# Patient Record
Sex: FEMALE | Race: WHITE | URBAN - METROPOLITAN AREA
[De-identification: names, ages, dates, MRNs, and addresses within clinical notes are randomized per-mention and may not be internally consistent; named-entity substitution may affect disease eponyms.]

---

## 2017-02-07 ENCOUNTER — OFFICE VISIT (OUTPATIENT)
Dept: FAMILY MEDICINE CLINIC | Age: 62
End: 2017-02-07

## 2017-02-07 VITALS
TEMPERATURE: 98.8 F | WEIGHT: 201 LBS | DIASTOLIC BLOOD PRESSURE: 76 MMHG | SYSTOLIC BLOOD PRESSURE: 134 MMHG | HEIGHT: 62 IN | OXYGEN SATURATION: 93 % | HEART RATE: 70 BPM | BODY MASS INDEX: 36.99 KG/M2

## 2017-02-07 DIAGNOSIS — N89.8 VAGINAL DISCHARGE: ICD-10-CM

## 2017-02-07 DIAGNOSIS — E78.00 PURE HYPERCHOLESTEROLEMIA: ICD-10-CM

## 2017-02-07 DIAGNOSIS — K59.09 CHRONIC CONSTIPATION: ICD-10-CM

## 2017-02-07 DIAGNOSIS — L30.9 DERMATITIS: ICD-10-CM

## 2017-02-07 DIAGNOSIS — Z11.59 SPECIAL SCREENING EXAMINATION FOR VIRAL DISEASE: ICD-10-CM

## 2017-02-07 DIAGNOSIS — K90.0 CELIAC DISEASE: ICD-10-CM

## 2017-02-07 DIAGNOSIS — E03.9 ACQUIRED HYPOTHYROIDISM: ICD-10-CM

## 2017-02-07 DIAGNOSIS — R40.0 DAYTIME SOMNOLENCE: ICD-10-CM

## 2017-02-07 DIAGNOSIS — F32.1 MODERATE SINGLE CURRENT EPISODE OF MAJOR DEPRESSIVE DISORDER (HCC): ICD-10-CM

## 2017-02-07 DIAGNOSIS — N39.3 STRESS INCONTINENCE: ICD-10-CM

## 2017-02-07 DIAGNOSIS — R30.0 DYSURIA: Primary | ICD-10-CM

## 2017-02-07 LAB
BILIRUB UR QL STRIP: NEGATIVE
GLUCOSE UR-MCNC: NEGATIVE MG/DL
KETONES P FAST UR STRIP-MCNC: NEGATIVE MG/DL
PH UR STRIP: 8.5 [PH] (ref 4.6–8)
PROT UR QL STRIP: NEGATIVE MG/DL
SP GR UR STRIP: 1.02 (ref 1–1.03)
UA UROBILINOGEN AMB POC: ABNORMAL (ref 0.2–1)
URINALYSIS CLARITY POC: CLEAR
URINALYSIS COLOR POC: YELLOW
URINE BLOOD POC: NEGATIVE
URINE LEUKOCYTES POC: ABNORMAL
URINE NITRITES POC: NEGATIVE

## 2017-02-07 RX ORDER — CLOTRIMAZOLE AND BETAMETHASONE DIPROPIONATE 10; .64 MG/G; MG/G
CREAM TOPICAL
Qty: 130 G | Refills: 0 | Status: SHIPPED | OUTPATIENT
Start: 2017-02-07

## 2017-02-07 RX ORDER — LANOLIN ALCOHOL/MO/W.PET/CERES
500 CREAM (GRAM) TOPICAL DAILY
COMMUNITY

## 2017-02-07 RX ORDER — LEVOTHYROXINE SODIUM 50 UG/1
TABLET ORAL
COMMUNITY

## 2017-02-07 RX ORDER — ERGOCALCIFEROL 1.25 MG/1
50000 CAPSULE ORAL
COMMUNITY

## 2017-02-07 RX ORDER — ESCITALOPRAM OXALATE 20 MG/1
20 TABLET ORAL
COMMUNITY

## 2017-02-07 RX ORDER — GLUCOSAMINE/CHONDR SU A SOD 750-600 MG
TABLET ORAL
COMMUNITY

## 2017-02-07 RX ORDER — DESVENLAFAXINE SUCCINATE 50 MG/1
50 TABLET, EXTENDED RELEASE ORAL
COMMUNITY

## 2017-02-07 NOTE — PATIENT INSTRUCTIONS
You have been referred to dermatology. Please call one of the preferred providers listed below and schedule your appointment. Once you have scheduled your appointment, please call the office at [phone] leave the details of your appointment (provider you will be seeing, appointment date and time) on the voice mail. [facility]     [address], [address] , [address] [facility] [address]. Cassandra Fonseca [address]          You have been referred to sleep medicine. Please call one of the preferred providers listed below and schedule your appointment. Once you have scheduled your appointment, please call the office at [phone] and leave the details of your appointment (provider you will be seeing, appointment date and time) on the voice mail. [facility]  [phone]  [address] [place] [address]    [facility] [address]

## 2017-02-07 NOTE — MR AVS SNAPSHOT
Visit Information Date & Time Provider Department Dept. Phone Encounter #  
 2/7/2017  2:15 PM Bharathi Pfeiffer, [address]  [phone] [id number] Upcoming Health Maintenance Date Due Hepatitis C Screening 1955 DTaP/Tdap/Td series (1 - Tdap) 3/2/1975 ZOSTER VACCINE AGE 60> 3/2/2014 BREAST CANCER SCRN MAMMOGRAM 5/16/2018 COLONOSCOPY 8/1/2025 Allergies as of 2/7/2017  Review Complete On: 2/7/2017 By: Karie Ford LPN No Known Allergies Current Immunizations  Never Reviewed No immunizations on file. Not reviewed this visit You Were Diagnosed With   
  
 Codes Comments Dysuria    -  Primary ICD-10-CM: R30.0 ICD-9-CM: 076. 1 Celiac disease     ICD-10-CM: K90.0 ICD-9-CM: 579.0 Stress incontinence     ICD-10-CM: N39.3 ICD-9-CM: PLQ8249 Daytime somnolence     ICD-10-CM: R40.0 ICD-9-CM: 780.54 Moderate single current episode of major depressive disorder (HCC)     ICD-10-CM: F32.1 ICD-9-CM: 296.22 Pure hypercholesterolemia     ICD-10-CM: E78.00 ICD-9-CM: 272.0 Acquired hypothyroidism     ICD-10-CM: E03.9 ICD-9-CM: 784. 9 Chronic constipation     ICD-10-CM: K59.09 
ICD-9-CM: 564.00 Dermatitis     ICD-10-CM: L30.9 ICD-9-CM: 692.9 Special screening examination for viral disease     ICD-10-CM: Z11.59 
ICD-9-CM: V73.99 Vitals BP Pulse Temp Height(growth percentile) Weight(growth percentile) SpO2  
 134/76 (BP 1 Location: Left arm, BP Patient Position: Sitting) 70 98.8 °F (37.1 °C) 5' 2\" (1.575 m) 201 lb (91.2 kg) 93% BMI Smoking Status 36.76 kg/m2 Never Smoker BMI and BSA Data Body Mass Index Body Surface Area  
 36.76 kg/m 2 2 m 2 Preferred Pharmacy Pharmacy Name Phone [address] [facility] [phone] Your Updated Medication List  
  
   
 This list is accurate as of: 2/7/17  3:15 PM.  Always use your most recent med list.  
  
  
  
  
 Biotin 2,500 mcg Cap Take  by mouth. clotrimazole-betamethasone topical cream  
Commonly known as:  Renaye Im Apply pea sized amount to affected areas bid  
  
 levothyroxine 50 mcg tablet Commonly known as:  SYNTHROID Take  by mouth Daily (before breakfast). LEXAPRO 20 mg tablet Generic drug:  escitalopram oxalate Take 20 mg by mouth nightly. PRISTIQ 50 mg tablet Generic drug:  Desvenlafaxine SR Take 50 mg by mouth. PROBIOTIC 4X 10-15 mg Tbec Generic drug:  B.infantis-B.ani-B.long-B.bifi Take  by mouth. VITAMIN B-12 500 mcg tablet Generic drug:  cyanocobalamin Take 500 mcg by mouth daily. VITAMIN D2 50,000 unit capsule Generic drug:  ergocalciferol Take 50,000 Units by mouth. WOMEN'S MULTIPLE VITAMINS PO Take  by mouth. Prescriptions Sent to Pharmacy Refills  
 clotrimazole-betamethasone (LOTRISONE) topical cream 0 Sig: Apply pea sized amount to affected areas bid Class: Normal  
 Pharmacy: [address], [address] Ph #: [phone] We Performed the Following AMB POC URINALYSIS DIP STICK AUTO W/O MICRO [09337 CPT(R)] REFERRAL TO NUTRITION [REF50 Custom] Comments:  
 Please evaluate patient for obesity and celiac. SLEEP MEDICINE REFERRAL [GGC518 Custom] Comments:  
 Please evaluate patient for daytime somnolence. To-Do List   
 02/07/2017 Lab:  CBC W/O DIFF   
  
 02/07/2017 Lab:  FERRITIN   
  
 02/07/2017 Lab:  HCV AB W/RFLX TO NAHID   
  
 02/07/2017 Lab:  IRON PROFILE   
  
 02/07/2017 Lab:  LIPID PANEL   
  
 02/07/2017 Lab:  METABOLIC PANEL, COMPREHENSIVE   
  
 02/07/2017 Lab:  TSH 3RD GENERATION   
  
 02/07/2017 Lab:  VITAMIN B12   
  
 02/07/2017 Lab:  VITAMIN D, 25 HYDROXY Referral Information Referral ID Referred By Referred To  
  
 [id number] [facility] V Not Available Visits Status Start Date End Date 1 New Request 2/7/17 2/7/18 If your referral has a status of pending review or denied, additional information will be sent to support the outcome of this decision. Referral ID Referred By Referred To  
 [id number] Critical access hospital V Not Available Visits Status Start Date End Date 1 New Request 2/7/17 2/7/18 If your referral has a status of pending review or denied, additional information will be sent to support the outcome of this decision. Patient Instructions You have been referred to dermatology. Please call one of the preferred providers listed below and schedule your appointment. Once you have scheduled your appointment, please call the office at [phone] leave the details of your appointment (provider you will be seeing, appointment date and time) on the voice mail. [facility] [place] , [facility] [address] [facility] [place] [address] [address] [place] [address] Introducing [facility] & HE[facility] SERVICES! [employer] introduces [employer] patient portal. Now you can access parts of your medical record, email your doctor's office, and request medication refills online. 1. In your internet browser, go to [email or web address] 2. Click on the First Time User? Click Here link in the Sign In box. You will see the New Member Sign Up page. 3. Enter your [employer] Access Code exactly as it appears below. You will not need to use this code after youve completed the sign-up process. If you do not sign up before the expiration date, you must request a new code. · [employer] Access Code: [email or web address] Expires: 5/8/2017  3:15 PM 
 
4.  Enter the last four digits of your Social Security Number (xxxx) and Date of Birth (mm/dd/yyyy) as indicated and click Submit. You will be taken to the next sign-up page. 5. Create a [employer] ID. This will be your [employer] login ID and cannot be changed, so think of one that is secure and easy to remember. 6. Create a [employer] password. You can change your password at any time. 7. Enter your Password Reset Question and Answer. This can be used at a later time if you forget your password. 8. Enter your e-mail address. You will receive e-mail notification when new information is available in [address] 9. Click Sign Up. You can now view and download portions of your medical record. 10. Click the Download Summary menu link to download a portable copy of your medical information. If you have questions, please visit the Frequently Asked Questions section of the [employer] website. Remember, [employer] is NOT to be used for urgent needs. For medical emergencies, dial 911. Now available from your iPhone and Android! Please provide this summary of care documentation to your next provider. Your primary care clinician is listed as [address] [address]. If you have any questions after today's visit, please call [phone].

## 2017-02-07 NOTE — PROGRESS NOTES
Assessment/Plan:    Brett Alexander was seen today for vaginal itching and establish care. Diagnoses and all orders for this visit:    Dysuria  -     AMB POC URINALYSIS DIP STICK AUTO W/O MICRO    Celiac disease- ck labs. Nutritionist referral to help with diet planning.  -     REFERRAL TO NUTRITION  -     CBC W/O DIFF; Future  -     VITAMIN D, 25 HYDROXY; Future  -     VITAMIN B12; Future  -     IRON PROFILE; Future  -     FERRITIN; Future    Stress incontinence  - consider pelvic floor PT in past    Daytime somnolence  -     Pt to make appt with sleep med    Moderate single current episode of major depressive disorder ([address]  -managed by psych    Pure hypercholesterolemia  -     METABOLIC PANEL, COMPREHENSIVE; Future  -     LIPID PANEL; Future    Acquired hypothyroidism  -     TSH 3RD GENERATION; Future    Chronic constipation  -will address hypothyroid first.   Once at goal, can treat with amitiza. Failed linzess. Dermatitis  -     clotrimazole-betamethasone (LOTRISONE) topical cream; Apply pea sized amount to affected areas bid    Special screening examination for viral disease  -     HCV AB W/RFLX TO NAHID; Future    Vaginal discharge  -trial of self-treatment with monistat. If still having sx, make appt for pelvic. The plan was discussed with the patient. The patient verbalized understanding and is in agreement with the plan. All medication potential side effects were discussed with the patient. Health Maintenance: colo 2015,   Health Maintenance   Topic Date Due    Hepatitis C Screening  1955    DTaP/Tdap/Td series (1 - Tdap) 03/02/1975    ZOSTER VACCINE AGE 60>  03/02/2014    BREAST CANCER SCRN MAMMOGRAM  05/16/2018    COLONOSCOPY  08/01/2025    INFLUENZA AGE 9 TO ADULT  Addressed       Sheng Schmitt is a 57 y.o.  female and presents with Vaginal Itching (x2 months) and Establish Care     Subjective:  Pt is here to establish care.     Depression - on pristiq and lexapro, managed by psych [facility]). She also sees a therapist.   Has long h/o, dx in childhood. Notes she has a high stress level. Has undergone [address] therapy. Has been on numerous different agents in the past. Has had suicidal thoughts in past, but states she wouldn't act on it. Has celiac. Is trying to follow a gluten free diet. But she is having difficulty with it. Requesting to go to nutritionist.  States the gluten free food she's eating is fattening and she's gained weight. Notes rash on chest, arms and back. She thinks it's related to celiac, dx by PCP with blood work. Has tried hydrocortisone with some relief. She is concerned about her cholesterol. Has h/o high cholesterol and triglycerides. Was on lipitor but stopped b/c of concern of possible side effects. Hypothyroid - she doesn't think her levels are good. She c/o fatigue, difficulty sleeping. +constipated (chronic). Has tried miralax, enema and suppositories. Has tried linzess but had severe diarrhea. +hair loss. +dry skin. She notes daytime somnolence. +snoring. +nocturia. Doesn't wake up feeling refreshed. Had sleep apnea test a long time ago and states she 'wasn't going into REM sleep'. She also thinks she has hypoglycemia. States if she doesn't eat, she will get sweaty, shaky and feels like she is going to pass out. She also thinks she has a vaginal infection. Has vaginal irritation and itching sometimes. Has some vaginal discharge. ROS:  Constitutional: No recent weight change. No weakness/+fatigue. No f/c. Skin: No rashes, +change in nails/hair, no itching   HENT: No HA, dizziness. No hearing loss/tinnitus. No nasal congestion/discharge. Eyes: No change in vision, double/blurred vision or eye pain/redness. Cardiovascular: No CP/palpitations. No VARGAS/orthopnea/PND. Respiratory: No cough/sputum, dyspnea, wheezing. Gastointestinal: No dysphagia, reflux. No n/v. + constipation/no diarrhea. No melena/rectal bleeding. Genitourinary: No dysuria, urinary hesitancy, +nocturia, no hematuria.  + incontinence. Musculoskeletal: No joint pain/stiffness. No muscle pain/tenderness. Endo: No heat/cold intolerance,+polyuria/+polydypsia. Heme: No h/o anemia. No easy bleeding/bruising. Allergy/Immunology: No seasonal rhinitis. Denies frequent colds, sinus/ear infections. Neurological: No seizures/numbness/weakness. No paresthesias. Psychiatric:  + depression, no anxiety. PMH:  Past Medical History   Diagnosis Date    Celiac disease     Eczema     Hypercholesterolemia     Major depression     Thyroid disease        PSH:  Past Surgical History   Procedure Laterality Date    Hx  section  1990    Hx ankle fracture tx Right 2005    Hx partial hysterectomy  1998    Hx breast reduction Bilateral         SH:  Social History   Substance Use Topics    Smoking status: Never Smoker    Smokeless tobacco: None    Alcohol use No       FH:  Family History   Problem Relation Age of Onset    Heart Disease Mother     Stroke Mother     Celiac Disease Father     Heart Disease Father        Medications/Allergies:    Current Outpatient Prescriptions:     levothyroxine (SYNTHROID) 50 mcg tablet, Take  by mouth Daily (before breakfast). , Disp: , Rfl:     Desvenlafaxine SR (PRISTIQ) 50 mg tablet, Take 50 mg by mouth., Disp: , Rfl:     escitalopram oxalate (LEXAPRO) 20 mg tablet, Take 20 mg by mouth nightly., Disp: , Rfl:     B.infantis-B.ani-B.long-B.bifi (PROBIOTIC 4X) 10-15 mg TbEC, Take  by mouth., Disp: , Rfl:     cyanocobalamin (VITAMIN B-12) 500 mcg tablet, Take 500 mcg by mouth daily. , Disp: , Rfl:     MULTIVIT WITH CALCIUM,IRON,MIN (WOMEN'S MULTIPLE VITAMINS PO), Take  by mouth., Disp: , Rfl:     Biotin 2,500 mcg cap, Take  by mouth., Disp: , Rfl:     ergocalciferol (VITAMIN D2) 50,000 unit capsule, Take 50,000 Units by mouth., Disp: , Rfl:   No Known Allergies    Objective:  Visit Vitals    /76 (BP 1 Location: Left arm, BP Patient Position: Sitting)    Pulse 70    Temp 98.8 °F (37.1 °C)    Ht 5' 2\" (1.575 m)    Wt 201 lb (91.2 kg)    SpO2 93%    BMI 36.76 kg/m2      Constitutional: Well developed, nourished, no distress, alert, obese habitus   HENT: Exterior ears and tympanic membranes normal bilaterally. Supple neck. No thyromegaly or lymphadenopathy. Oropharynx clear and moist mucous membranes. Eyes: Conjunctiva normal. PERRL. Cardiovascular: S1, S2.  RRR. No murmurs/rubs. No thrills palpated. No carotid bruits. Intact distal pulses. No edema. Pulmonary/Chest Wall: No abnormalities on inspection. Clear to auscultation bilaterally. No wheezing/rhonchi. Normal effort. GI: Soft, nontender, nondistended. Normal active bowel sounds. No  masses on palpation. No hepatosplenomegaly. Musculoskeletal: Gait normal.  Joints without deformity/tenderness. Neurological: Appropriate. No focal motor or sensory deficits. Speech normal.   Skin: +erythematous papules and plaques on back and arms with overlying scale   Psych: Appropriate affect, judgement and insight. Short-term memory intact.

## 2017-02-14 ENCOUNTER — HOSPITAL ENCOUNTER (OUTPATIENT)
Dept: LAB | Age: 62
Discharge: HOME | End: 2017-02-14
Payer: OTHER GOVERNMENT

## 2017-02-14 DIAGNOSIS — E03.9 ACQUIRED HYPOTHYROIDISM: ICD-10-CM

## 2017-02-14 DIAGNOSIS — E78.00 PURE HYPERCHOLESTEROLEMIA: ICD-10-CM

## 2017-02-14 DIAGNOSIS — Z11.59 SPECIAL SCREENING EXAMINATION FOR VIRAL DISEASE: ICD-10-CM

## 2017-02-14 DIAGNOSIS — K90.0 CELIAC DISEASE: ICD-10-CM

## 2017-02-14 LAB
ALBUMIN SERPL BCP-MCNC: 4 G/DL (ref 3.4–5)
ALBUMIN/GLOB SERPL: 1.3 {RATIO} (ref 0.8–1.7)
ALP SERPL-CCNC: 101 U/L (ref 45–117)
ALT SERPL-CCNC: 52 U/L (ref 13–56)
ANION GAP BLD CALC-SCNC: 7 MMOL/L (ref 3–18)
AST SERPL W P-5'-P-CCNC: 42 U/L (ref 15–37)
BILIRUB SERPL-MCNC: 0.5 MG/DL (ref 0.2–1)
BUN SERPL-MCNC: 16 MG/DL (ref 7–18)
BUN/CREAT SERPL: 15 (ref 12–20)
CALCIUM SERPL-MCNC: 9.3 MG/DL (ref 8.5–10.1)
CHLORIDE SERPL-SCNC: 105 MMOL/L (ref 100–108)
CHOLEST SERPL-MCNC: 265 MG/DL
CO2 SERPL-SCNC: 30 MMOL/L (ref 21–32)
CREAT SERPL-MCNC: 1.07 MG/DL (ref 0.6–1.3)
ERYTHROCYTE [DISTWIDTH] IN BLOOD BY AUTOMATED COUNT: 13 % (ref 11.6–14.5)
FERRITIN SERPL-MCNC: 98 NG/ML (ref 8–388)
GLOBULIN SER CALC-MCNC: 3.1 G/DL (ref 2–4)
GLUCOSE SERPL-MCNC: 88 MG/DL (ref 74–99)
HCT VFR BLD AUTO: 44.4 % (ref 35–45)
HDLC SERPL-MCNC: 42 MG/DL (ref 40–60)
HDLC SERPL: 6.3 {RATIO} (ref 0–5)
HGB BLD-MCNC: 15.1 G/DL (ref 12–16)
IRON SATN MFR SERPL: 29 %
IRON SERPL-MCNC: 95 UG/DL (ref 50–175)
LDLC SERPL CALC-MCNC: 180 MG/DL (ref 0–100)
LIPID PROFILE,FLP: ABNORMAL
MCH RBC QN AUTO: 31.2 PG (ref 24–34)
MCHC RBC AUTO-ENTMCNC: 34 G/DL (ref 31–37)
MCV RBC AUTO: 91.7 FL (ref 74–97)
PLATELET # BLD AUTO: 226 K/UL (ref 135–420)
PMV BLD AUTO: 10 FL (ref 9.2–11.8)
POTASSIUM SERPL-SCNC: 4.4 MMOL/L (ref 3.5–5.5)
PROT SERPL-MCNC: 7.1 G/DL (ref 6.4–8.2)
RBC # BLD AUTO: 4.84 M/UL (ref 4.2–5.3)
SODIUM SERPL-SCNC: 142 MMOL/L (ref 136–145)
TIBC SERPL-MCNC: 333 UG/DL (ref 250–450)
TRIGL SERPL-MCNC: 215 MG/DL (ref ?–150)
TSH SERPL DL<=0.05 MIU/L-ACNC: 1.53 UIU/ML (ref 0.36–3.74)
VIT B12 SERPL-MCNC: 1841 PG/ML (ref 211–911)
VLDLC SERPL CALC-MCNC: 43 MG/DL
WBC # BLD AUTO: 6.4 K/UL (ref 4.6–13.2)

## 2017-02-14 PROCEDURE — 82306 VITAMIN D 25 HYDROXY: CPT | Performed by: INTERNAL MEDICINE

## 2017-02-14 PROCEDURE — 80053 COMPREHEN METABOLIC PANEL: CPT | Performed by: INTERNAL MEDICINE

## 2017-02-14 PROCEDURE — 82728 ASSAY OF FERRITIN: CPT | Performed by: INTERNAL MEDICINE

## 2017-02-14 PROCEDURE — 82607 VITAMIN B-12: CPT | Performed by: INTERNAL MEDICINE

## 2017-02-14 PROCEDURE — 83540 ASSAY OF IRON: CPT | Performed by: INTERNAL MEDICINE

## 2017-02-14 PROCEDURE — 85027 COMPLETE CBC AUTOMATED: CPT | Performed by: INTERNAL MEDICINE

## 2017-02-14 PROCEDURE — 80061 LIPID PANEL: CPT | Performed by: INTERNAL MEDICINE

## 2017-02-14 PROCEDURE — 86803 HEPATITIS C AB TEST: CPT | Performed by: INTERNAL MEDICINE

## 2017-02-14 PROCEDURE — 36415 COLL VENOUS BLD VENIPUNCTURE: CPT | Performed by: INTERNAL MEDICINE

## 2017-02-14 PROCEDURE — 84443 ASSAY THYROID STIM HORMONE: CPT | Performed by: INTERNAL MEDICINE

## 2017-02-14 RX ORDER — CIPROFLOXACIN 250 MG/1
250 TABLET, FILM COATED ORAL EVERY 12 HOURS
Qty: 6 TAB | Refills: 0 | Status: SHIPPED | OUTPATIENT
Start: 2017-02-14 | End: 2017-02-17

## 2017-02-15 DIAGNOSIS — E78.2 MIXED HYPERCHOLESTEROLEMIA AND HYPERTRIGLYCERIDEMIA: Primary | ICD-10-CM

## 2017-02-15 DIAGNOSIS — R74.01 TRANSAMINITIS: ICD-10-CM

## 2017-02-15 PROBLEM — E78.00 PURE HYPERCHOLESTEROLEMIA: Status: RESOLVED | Noted: 2017-02-07 | Resolved: 2017-02-15

## 2017-02-15 LAB
25(OH)D3 SERPL-MCNC: 41.6 NG/ML (ref 30–100)
HCV AB S/CO SERPL IA: <0.1 S/CO RATIO (ref 0–0.9)
HCV AB SERPL QL IA: NORMAL

## 2017-02-15 NOTE — PROGRESS NOTES
Tell pt her labs showed her cholesterol is very high. LDL is 180 (goal <100). Her triglycerides are also high. I want her to cut back on red meat, fried food, processed food, cheese. We will repeat labs in 3-6 months. She looked slightly dehydrated - make sure to drink plenty of water.